# Patient Record
Sex: FEMALE | Race: WHITE | NOT HISPANIC OR LATINO | Employment: FULL TIME | RURAL
[De-identification: names, ages, dates, MRNs, and addresses within clinical notes are randomized per-mention and may not be internally consistent; named-entity substitution may affect disease eponyms.]

---

## 2023-05-04 ENCOUNTER — TELEPHONE (OUTPATIENT)
Dept: FAMILY MEDICINE CLINIC | Facility: CLINIC | Age: 48
End: 2023-05-04

## 2023-05-04 ENCOUNTER — OFFICE VISIT (OUTPATIENT)
Dept: FAMILY MEDICINE CLINIC | Facility: OTHER | Age: 48
End: 2023-05-04
Payer: COMMERCIAL

## 2023-05-04 VITALS — HEIGHT: 67 IN | BODY MASS INDEX: 21.19 KG/M2 | WEIGHT: 135 LBS

## 2023-05-04 DIAGNOSIS — B35.1 NAIL FUNGUS: ICD-10-CM

## 2023-05-04 DIAGNOSIS — B37.2 YEAST DERMATITIS: Primary | ICD-10-CM

## 2023-05-04 RX ORDER — ESTRADIOL 1 MG/1
1 TABLET ORAL DAILY
COMMUNITY
Start: 2023-01-30

## 2023-05-04 RX ORDER — FLUTICASONE PROPIONATE 50 MCG
SPRAY, SUSPENSION (ML) NASAL
COMMUNITY
Start: 2023-03-03

## 2023-05-04 RX ORDER — ZOLPIDEM TARTRATE 10 MG/1
10 TABLET ORAL
COMMUNITY
Start: 2023-04-25

## 2023-05-04 RX ORDER — NYSTATIN 100000 [USP'U]/G
POWDER TOPICAL 2 TIMES DAILY
Qty: 15 G | Refills: 0 | Status: SHIPPED | OUTPATIENT
Start: 2023-05-04

## 2023-05-04 NOTE — ASSESSMENT & PLAN NOTE
Yellowing and thickening of the left pinky and ring fingernails.  Onset last week.  Treatment terbinafine solution twice daily.

## 2023-05-04 NOTE — PROGRESS NOTES
"    Office Note     Name: Moira Sánchez    : 1975     MRN: [id number]     Chief Complaint  Nail Problem and Nasal Congestion    Subjective     History of Present Illness:  Moira Sánchez is a 47 y.o. female who presents today for nail fungus and belly button irritation.  She first noticed her nail thickening and discoloration last week.  Affected fingers include left ring and pinky finger.  She has not used any over-the-counter antifungal polishes, creams etc. for this color change.  She reports noticing  belly button irritation one week ago, itchy, red and some \"infection\" oozing. She makes note that she just finished a round of  levaquin for strep pharyngitis.  He has used \"medicated cream\" and Goldbond powder without any benefit.  Denies any additional rashes or fever.  No further complaints or concerns.    Review of Systems   Constitutional: Negative for appetite change and fatigue.   HENT: Negative for sore throat.    Respiratory: Negative for shortness of breath and wheezing.    Cardiovascular: Negative for chest pain, palpitations and leg swelling.   Gastrointestinal: Negative for abdominal pain, constipation, diarrhea, nausea and vomiting.   Musculoskeletal: Negative for arthralgias and myalgias.   Skin: Positive for rash.   Neurological: Negative for headache.       Objective     No past medical history on file.  No past surgical history on file.  No family history on file.    Vital Signs  Ht 170.2 cm (67\")   Wt 61.2 kg (135 lb)   BMI 21.14 kg/m²   Estimated body mass index is 21.14 kg/m² as calculated from the following:    Height as of this encounter: 170.2 cm (67\").    Weight as of this encounter: 61.2 kg (135 lb).    Physical Exam  Vitals reviewed.   Constitutional:       Appearance: Normal appearance.   HENT:      Head: Normocephalic and atraumatic.      Right Ear: Ear canal and external ear normal.      Left Ear: Tympanic membrane, ear canal and external ear normal.      Nose: Nose normal.     "  Mouth/Throat:      Pharynx: Oropharynx is clear.   Eyes:      Conjunctiva/sclera: Conjunctivae normal.   Cardiovascular:      Rate and Rhythm: Normal rate and regular rhythm.      Pulses: Normal pulses.      Heart sounds: Normal heart sounds.   Pulmonary:      Effort: Pulmonary effort is normal.      Breath sounds: Normal breath sounds.   Abdominal:      General: Bowel sounds are normal.   Skin:     General: Skin is warm and dry.      Findings: Rash present.      Comments: Moist erythematous flat rash noted within and surrounding the umbilicus.  No crusting or oozing noted some thick white exudate noted.  Left ring and pinky fingernail with thickening and yellowing at the tips.   Neurological:      General: No focal deficit present.      Mental Status: She is alert and oriented to person, place, and time.   Psychiatric:         Mood and Affect: Mood normal.         Behavior: Behavior normal.         Thought Content: Thought content normal.         Judgment: Judgment normal.            POCT Results (if applicable):  No results found for this or any previous visit.         Assessment and Plan     Diagnoses and all orders for this visit:    1. Yeast dermatitis (Primary)  Assessment & Plan:  Yeast dermatitis of the umbilicus noticed on exam after completing regimen of Levaquin for streptococcal pharyngitis.  Advised daily probiotic to help regain normal fredrick.  Advised to keep umbilicus warm and dry.  Also treat with twice daily nystatin powder      2. Nail fungus  Assessment & Plan:  Yellowing and thickening of the left pinky and ring fingernails.  Onset last week.  Treatment terbinafine solution twice daily.      Other orders  -     Terbinafine HCl 5 % solution; Apply 1 application topically 2 (Two) Times a Day.  Dispense: 15 g; Refill: 0  -     nystatin (MYCOSTATIN) 305155 UNIT/GM powder; Apply  topically to the appropriate area as directed 2 (Two) Times a Day.  Dispense: 15 g; Refill: 0    BMI is within normal  parameters. No other follow-up for BMI required.        Follow Up  No follow-ups on file.    Yashira Fleming, APRN

## 2023-05-04 NOTE — ASSESSMENT & PLAN NOTE
Yeast dermatitis of the umbilicus noticed on exam after completing regimen of Levaquin for streptococcal pharyngitis.  Advised daily probiotic to help regain normal fredrick.  Advised to keep umbilicus warm and dry.  Also treat with twice daily nystatin powder